# Patient Record
Sex: FEMALE | Race: WHITE | NOT HISPANIC OR LATINO
[De-identification: names, ages, dates, MRNs, and addresses within clinical notes are randomized per-mention and may not be internally consistent; named-entity substitution may affect disease eponyms.]

---

## 2018-01-25 ENCOUNTER — APPOINTMENT (OUTPATIENT)
Dept: PULMONOLOGY | Facility: CLINIC | Age: 78
End: 2018-01-25
Payer: MEDICARE

## 2018-01-25 PROCEDURE — 99214 OFFICE O/P EST MOD 30 MIN: CPT | Mod: 25

## 2018-01-25 PROCEDURE — 94010 BREATHING CAPACITY TEST: CPT

## 2018-04-13 ENCOUNTER — APPOINTMENT (OUTPATIENT)
Dept: PULMONOLOGY | Facility: CLINIC | Age: 78
End: 2018-04-13
Payer: MEDICARE

## 2018-04-13 DIAGNOSIS — R05 COUGH: ICD-10-CM

## 2018-04-13 PROCEDURE — 71046 X-RAY EXAM CHEST 2 VIEWS: CPT

## 2018-04-13 PROCEDURE — 94010 BREATHING CAPACITY TEST: CPT

## 2018-04-13 PROCEDURE — 99214 OFFICE O/P EST MOD 30 MIN: CPT | Mod: 25

## 2018-04-15 PROBLEM — R05 CHRONIC COUGH: Status: ACTIVE | Noted: 2018-04-15

## 2020-03-02 ENCOUNTER — APPOINTMENT (OUTPATIENT)
Dept: PULMONOLOGY | Facility: CLINIC | Age: 80
End: 2020-03-02
Payer: MEDICARE

## 2020-03-02 VITALS
OXYGEN SATURATION: 94 % | WEIGHT: 113 LBS | DIASTOLIC BLOOD PRESSURE: 72 MMHG | TEMPERATURE: 98.9 F | SYSTOLIC BLOOD PRESSURE: 136 MMHG | HEART RATE: 89 BPM | BODY MASS INDEX: 18.83 KG/M2 | HEIGHT: 65 IN

## 2020-03-02 PROCEDURE — 94010 BREATHING CAPACITY TEST: CPT

## 2020-03-02 PROCEDURE — 99213 OFFICE O/P EST LOW 20 MIN: CPT | Mod: 25

## 2020-03-02 PROCEDURE — 71046 X-RAY EXAM CHEST 2 VIEWS: CPT

## 2020-03-02 RX ORDER — METHYLPREDNISOLONE 4 MG/1
4 TABLET ORAL
Qty: 1 | Refills: 0 | Status: ACTIVE | COMMUNITY
Start: 2020-03-02 | End: 1900-01-01

## 2020-03-03 RX ORDER — CEFDINIR 300 MG/1
300 CAPSULE ORAL
Qty: 14 | Refills: 1 | Status: ACTIVE | COMMUNITY
Start: 2020-03-02 | End: 1900-01-01

## 2020-03-03 NOTE — PROCEDURE
[FreeTextEntry1] : spiromery with mild resttriction\par \par unchanged chest film with inflamamtory changes

## 2020-03-03 NOTE — DISCUSSION/SUMMARY
[FreeTextEntry1] : plan exacerbation of bronchiectasis\par \par plan cefdinir\par \par medrol p ack\par \par follow up prn

## 2020-03-03 NOTE — HISTORY OF PRESENT ILLNESS
[TextBox_4] : woman with bronchiectasis and also an element of neurogenic cogh, here with resp infection, initally clear, now broown,  99 degrees.  takes gabapentin on a regular basis, 300mg bid dx with singh, records not avaiable on that.  she is quite anxious about this bout, but there is a reasonable chance it woule resolve without intervntion

## 2020-03-03 NOTE — PHYSICAL EXAM
[No Acute Distress] : no acute distress [Normal Oropharynx] : normal oropharynx [Normal Appearance] : normal appearance [Normal Rate/Rhythm] : normal rate/rhythm [No Murmurs] : no murmurs [Clear to Auscultation Bilaterally] : clear to auscultation bilaterally [No Abnormalities] : no abnormalities [Benign] : benign [Normal Gait] : normal gait [No Clubbing] : no clubbing [No Edema] : no edema [TextBox_68] : few rhonchi

## 2020-03-03 NOTE — REASON FOR VISIT
[Follow-Up] : a follow-up visit [TextBox_44] : friday, began coughing, and now tannish,  no antibiotics.

## 2023-05-23 ENCOUNTER — APPOINTMENT (OUTPATIENT)
Dept: PULMONOLOGY | Facility: CLINIC | Age: 83
End: 2023-05-23
Payer: MEDICARE

## 2023-05-23 VITALS
HEIGHT: 65 IN | HEART RATE: 87 BPM | BODY MASS INDEX: 18.33 KG/M2 | TEMPERATURE: 97.1 F | OXYGEN SATURATION: 97 % | DIASTOLIC BLOOD PRESSURE: 76 MMHG | SYSTOLIC BLOOD PRESSURE: 128 MMHG | WEIGHT: 110 LBS

## 2023-05-23 PROCEDURE — 99214 OFFICE O/P EST MOD 30 MIN: CPT | Mod: 25

## 2023-05-23 PROCEDURE — 71046 X-RAY EXAM CHEST 2 VIEWS: CPT

## 2023-05-25 NOTE — REASON FOR VISIT
[Follow-Up] : a follow-up visit [TextBox_44] : patient is an ex smoker told she should see pulm because of apical scarring on an upper ct

## 2023-05-25 NOTE — DISCUSSION/SUMMARY
[FreeTextEntry1] : a yearly chest film is all i suggest\par \par showed her the previous ct scan she had and described the bronchiectasis\par \par reassured.\par \par she spent a great amount of time discussing non pulmonary issues.\par \par i listened

## 2023-05-25 NOTE — HISTORY OF PRESENT ILLNESS
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 side are heard where her bronchiectasis was seen in the past\par \par \par \par